# Patient Record
Sex: FEMALE | Race: WHITE | NOT HISPANIC OR LATINO | Employment: OTHER | URBAN - METROPOLITAN AREA
[De-identification: names, ages, dates, MRNs, and addresses within clinical notes are randomized per-mention and may not be internally consistent; named-entity substitution may affect disease eponyms.]

---

## 2018-09-02 ENCOUNTER — HOSPITAL ENCOUNTER (EMERGENCY)
Facility: MEDICAL CENTER | Age: 83
End: 2018-09-02
Attending: EMERGENCY MEDICINE
Payer: MEDICARE

## 2018-09-02 ENCOUNTER — APPOINTMENT (OUTPATIENT)
Dept: RADIOLOGY | Facility: MEDICAL CENTER | Age: 83
End: 2018-09-02
Attending: EMERGENCY MEDICINE
Payer: MEDICARE

## 2018-09-02 VITALS
SYSTOLIC BLOOD PRESSURE: 160 MMHG | TEMPERATURE: 98.5 F | BODY MASS INDEX: 29.09 KG/M2 | OXYGEN SATURATION: 98 % | RESPIRATION RATE: 18 BRPM | DIASTOLIC BLOOD PRESSURE: 80 MMHG | HEART RATE: 67 BPM | WEIGHT: 158.07 LBS | HEIGHT: 62 IN

## 2018-09-02 DIAGNOSIS — T88.7XXA MEDICATION SIDE EFFECT: ICD-10-CM

## 2018-09-02 DIAGNOSIS — N39.0 URINARY TRACT INFECTION WITHOUT HEMATURIA, SITE UNSPECIFIED: ICD-10-CM

## 2018-09-02 LAB
ALBUMIN SERPL BCP-MCNC: 3.9 G/DL (ref 3.2–4.9)
ALBUMIN/GLOB SERPL: 1.1 G/DL
ALP SERPL-CCNC: 95 U/L (ref 30–99)
ALT SERPL-CCNC: 17 U/L (ref 2–50)
ANION GAP SERPL CALC-SCNC: 10 MMOL/L (ref 0–11.9)
APPEARANCE UR: ABNORMAL
AST SERPL-CCNC: 23 U/L (ref 12–45)
BACTERIA #/AREA URNS HPF: ABNORMAL /HPF
BASOPHILS # BLD AUTO: 0.1 % (ref 0–1.8)
BASOPHILS # BLD: 0.01 K/UL (ref 0–0.12)
BILIRUB SERPL-MCNC: 0.5 MG/DL (ref 0.1–1.5)
BILIRUB UR QL STRIP.AUTO: NEGATIVE
BUN SERPL-MCNC: 14 MG/DL (ref 8–22)
CALCIUM SERPL-MCNC: 9.2 MG/DL (ref 8.4–10.2)
CHLORIDE SERPL-SCNC: 96 MMOL/L (ref 96–112)
CO2 SERPL-SCNC: 24 MMOL/L (ref 20–33)
COLOR UR: YELLOW
CREAT SERPL-MCNC: 0.71 MG/DL (ref 0.5–1.4)
EOSINOPHIL # BLD AUTO: 0.02 K/UL (ref 0–0.51)
EOSINOPHIL NFR BLD: 0.3 % (ref 0–6.9)
EPI CELLS #/AREA URNS HPF: ABNORMAL /HPF
ERYTHROCYTE [DISTWIDTH] IN BLOOD BY AUTOMATED COUNT: 40.2 FL (ref 35.9–50)
GLOBULIN SER CALC-MCNC: 3.5 G/DL (ref 1.9–3.5)
GLUCOSE SERPL-MCNC: 208 MG/DL (ref 65–99)
GLUCOSE UR STRIP.AUTO-MCNC: NEGATIVE MG/DL
HCT VFR BLD AUTO: 35.1 % (ref 37–47)
HGB BLD-MCNC: 11.3 G/DL (ref 12–16)
IMM GRANULOCYTES # BLD AUTO: 0.02 K/UL (ref 0–0.11)
IMM GRANULOCYTES NFR BLD AUTO: 0.3 % (ref 0–0.9)
KETONES UR STRIP.AUTO-MCNC: NEGATIVE MG/DL
LACTATE BLD-SCNC: 2.9 MMOL/L (ref 0.5–2)
LEUKOCYTE ESTERASE UR QL STRIP.AUTO: ABNORMAL
LYMPHOCYTES # BLD AUTO: 1.09 K/UL (ref 1–4.8)
LYMPHOCYTES NFR BLD: 15.4 % (ref 22–41)
MCH RBC QN AUTO: 25.1 PG (ref 27–33)
MCHC RBC AUTO-ENTMCNC: 32.2 G/DL (ref 33.6–35)
MCV RBC AUTO: 77.8 FL (ref 81.4–97.8)
MICRO URNS: ABNORMAL
MONOCYTES # BLD AUTO: 0.26 K/UL (ref 0–0.85)
MONOCYTES NFR BLD AUTO: 3.7 % (ref 0–13.4)
NEUTROPHILS # BLD AUTO: 5.7 K/UL (ref 2–7.15)
NEUTROPHILS NFR BLD: 80.2 % (ref 44–72)
NITRITE UR QL STRIP.AUTO: NEGATIVE
NRBC # BLD AUTO: 0 K/UL
NRBC BLD-RTO: 0 /100 WBC
PH UR STRIP.AUTO: 7 [PH]
PLATELET # BLD AUTO: 241 K/UL (ref 164–446)
PMV BLD AUTO: 9.8 FL (ref 9–12.9)
POTASSIUM SERPL-SCNC: 3.4 MMOL/L (ref 3.6–5.5)
PROT SERPL-MCNC: 7.4 G/DL (ref 6–8.2)
PROT UR QL STRIP: NEGATIVE MG/DL
RBC # BLD AUTO: 4.51 M/UL (ref 4.2–5.4)
RBC # URNS HPF: ABNORMAL /HPF
RBC UR QL AUTO: ABNORMAL
SODIUM SERPL-SCNC: 130 MMOL/L (ref 135–145)
SP GR UR STRIP.AUTO: <=1.005
WBC # BLD AUTO: 7.1 K/UL (ref 4.8–10.8)
WBC #/AREA URNS HPF: ABNORMAL /HPF

## 2018-09-02 PROCEDURE — 83605 ASSAY OF LACTIC ACID: CPT

## 2018-09-02 PROCEDURE — 81001 URINALYSIS AUTO W/SCOPE: CPT

## 2018-09-02 PROCEDURE — 85025 COMPLETE CBC W/AUTO DIFF WBC: CPT

## 2018-09-02 PROCEDURE — 99284 EMERGENCY DEPT VISIT MOD MDM: CPT

## 2018-09-02 PROCEDURE — 700102 HCHG RX REV CODE 250 W/ 637 OVERRIDE(OP): Performed by: EMERGENCY MEDICINE

## 2018-09-02 PROCEDURE — 80053 COMPREHEN METABOLIC PANEL: CPT

## 2018-09-02 PROCEDURE — A9270 NON-COVERED ITEM OR SERVICE: HCPCS | Performed by: EMERGENCY MEDICINE

## 2018-09-02 PROCEDURE — 71045 X-RAY EXAM CHEST 1 VIEW: CPT

## 2018-09-02 RX ORDER — ACETAMINOPHEN 325 MG/1
650 TABLET ORAL ONCE
Status: COMPLETED | OUTPATIENT
Start: 2018-09-02 | End: 2018-09-02

## 2018-09-02 RX ORDER — CARVEDILOL 25 MG/1
25 TABLET ORAL 2 TIMES DAILY WITH MEALS
COMMUNITY

## 2018-09-02 RX ORDER — GABAPENTIN 100 MG/1
100 CAPSULE ORAL 2 TIMES DAILY
COMMUNITY

## 2018-09-02 RX ORDER — PRAVASTATIN SODIUM 10 MG
10 TABLET ORAL NIGHTLY
COMMUNITY

## 2018-09-02 RX ORDER — NITROFURANTOIN 25; 75 MG/1; MG/1
100 CAPSULE ORAL 2 TIMES DAILY
Qty: 10 CAP | Refills: 0 | Status: SHIPPED | OUTPATIENT
Start: 2018-09-02 | End: 2018-09-02

## 2018-09-02 RX ORDER — ONDANSETRON 4 MG/1
4 TABLET, ORALLY DISINTEGRATING ORAL EVERY 6 HOURS PRN
COMMUNITY

## 2018-09-02 RX ORDER — HYDROCHLOROTHIAZIDE 25 MG/1
25 TABLET ORAL DAILY
COMMUNITY

## 2018-09-02 RX ORDER — CEPHALEXIN 250 MG/1
500 CAPSULE ORAL ONCE
Status: COMPLETED | OUTPATIENT
Start: 2018-09-02 | End: 2018-09-02

## 2018-09-02 RX ORDER — LISINOPRIL 20 MG/1
40 TABLET ORAL DAILY
COMMUNITY

## 2018-09-02 RX ORDER — NITROFURANTOIN 25; 75 MG/1; MG/1
100 CAPSULE ORAL ONCE
Status: DISCONTINUED | OUTPATIENT
Start: 2018-09-02 | End: 2018-09-02

## 2018-09-02 RX ORDER — BUPIVACAINE HYDROCHLORIDE 2.5 MG/ML
1.53 INJECTION, SOLUTION EPIDURAL; INFILTRATION; INTRACAUDAL CONTINUOUS
COMMUNITY

## 2018-09-02 RX ORDER — CEPHALEXIN 500 MG/1
500 CAPSULE ORAL 2 TIMES DAILY
Qty: 10 CAP | Refills: 0 | Status: SHIPPED | OUTPATIENT
Start: 2018-09-02 | End: 2018-09-07

## 2018-09-02 RX ORDER — AMLODIPINE BESYLATE 5 MG/1
5 TABLET ORAL DAILY
COMMUNITY

## 2018-09-02 RX ADMIN — ACETAMINOPHEN 650 MG: 325 TABLET, FILM COATED ORAL at 14:57

## 2018-09-02 RX ADMIN — CEPHALEXIN 500 MG: 250 CAPSULE ORAL at 15:59

## 2018-09-02 ASSESSMENT — PAIN SCALES - GENERAL: PAINLEVEL_OUTOF10: 0

## 2018-09-02 NOTE — ED PROVIDER NOTES
"ED Provider Note    ED Provider Note    Primary care provider: Zhane Bazzi M.D.  Means of arrival: walk in  History obtained from: Patient    CHIEF COMPLAINT  Chief Complaint   Patient presents with   • Other     Seen at 1:01 PM.   HPI  Krystin Rivera is a 83 y.o. female who presents to the Emergency Department with a chief complaint of feeling \"groggy \".  She has chronic back pain and is on a intrathecal pain pump.  She has been on this for 2 years and had no significant changes in her dosing.  This week she had the addition of Prialt by her pain management doctor as the patient was complaining of worsening back pain.  She does have chronic back pain despite the use of the pump.  She is nonambulatory at baseline though she does transfer.  This is been an ongoing issue and the patient has not ever been pain-free.    She notes for the past 2 days, she is hypersomnolent and groggy.  She notes the pain is worse though she did not take her bolus doses of pain medication secondary to her daughter's suggestion of avoiding additional medications.    She denies any headaches, fevers, chills, visual changes, abdominal pain, nausea, vomiting, dysuria, new numbness or new focal weakness.  She does feel generalized weakness, she has diffuse neuropathy at baseline, she has urge incontinence at baseline, she has photophobia at baseline.  She notes some right-sided lower chest wall pain and a cough for the past few days as well and she feels that her breathing is not quite as good as usual.    REVIEW OF SYSTEMS  See HPI,   Remainder of ROS negative.     PAST MEDICAL HISTORY   has a past medical history of Diabetes (HCC) and Hypertension.    SURGICAL HISTORY  patient denies any surgical history    SOCIAL HISTORY  Social History   Substance Use Topics   • Smoking status: Never Smoker   • Smokeless tobacco: Never Used   • Alcohol use No      History   Drug Use No       FAMILY HISTORY  History reviewed. No pertinent family " "history.    CURRENT MEDICATIONS  Reviewed.  See Encounter Summary.     ALLERGIES  Allergies   Allergen Reactions   • Other Drug      Steroids.  hallucinations       PHYSICAL EXAM  VITAL SIGNS: /80   Pulse 67   Temp 36.9 °C (98.5 °F)   Resp 18   Ht 1.575 m (5' 2\")   Wt 71.7 kg (158 lb 1.1 oz)   SpO2 98%   BMI 28.91 kg/m²   Constitutional: Awake, alert in no apparent distress.  Stays awake throughout the duration of the history and physical examination.  She does appear to be mildly sleepy.  HENT: Normocephalic, atraumatic, bilateral external ears normal. Nose normal.   Eyes: Conjunctiva normal, non-icteric, EOMI. PERRLA  Thorax & Lungs: Easy unlabored respirations, Clear to ascultation bilaterally.  Cardiovascular: Regular rate, Regular rhythm, No murmurs, rubs or gallops.  Abdomen:  Soft, nontender, nondistended, normal active bowel sounds.   :    Skin: Visualized skin is  Dry, No erythema, No rash.  Diffuse nevi across the back and abdomen.  Musculoskeletal:   No cyanosis, clubbing or edema.  The back is normal in appearance.  There is no midline tenderness or step-off.  The pain pump is implanted in the right upper gluteal region, overlying skin is normal.  Neurologic: Alert, Grossly non-focal.  Patient has excellent strength of the upper extremities, she is able to give 5/5 strength in plantar and dorsiflexion at the bilateral lower extremities.  Sensation is intact to light touch.  Deep tendon reflexes 1+ at bilateral patella.  Psychiatric: Normal affect, Normal mood  Lymphatic:  No cervical LAD  RADIOLOGY  DX-CHEST-PORTABLE (1 VIEW)   Final Result      Left basal atelectasis or scarring.            COURSE & MEDICAL DECISION MAKING  Pertinent Labs & Imaging studies reviewed. (See chart for details)    Differential diagnoses include but are not limited to: Medication side effect, sepsis    1:01 PM - Medical record reviewed, patient seen and examined at bedside.    1:05 PM - Aram (Nurse from " [employer]:   Fentanyl 62mcg/day, Dilaudid 250mcg, bupivicaine, Prialt ([facility])- new past week. Can cause AVH.   Dr. Behrman. [phone]    2:45 PM -I discussed the case with Aram from [employer] as well as Dr. Behrman.  We will decrease the intrathecal basal rate by 25%.  The patient will still be able to use her bolus as needed.  I reviewed the labs with the patient, it appears that she has a urinary tract infection, the rest of her blood work is unremarkable.  She does not have acute renal failure, severe electrolyte derangement, leukocytosis or leftward shift.  She does have a mildly increased lactic acid, I do not feel this represents sepsis and is more a sequelae of mild dehydration or possibly metformin use.        Decision Making:  This is a 83 y.o. year old female who presents with increased somnolence after a change in her intrathecal pain pump.  We reviewed the medications with the patient.  She has had challenging pain to control at baseline.  They increased her calcium channel blocker which is known to cause sedation and hallucinations.  We believe that this is the cause of the patient's somnolence.  In the emergency department she is awake and able to answer all questions, she did not appear to be suffering from acute bacterial meningitis or encephalitis.  She did not have a headache, she does not have any confusion.  She is neurologically intact.  Her back pain appears to be at baseline.  Laboratory evaluation is unremarkable with exception of urinary tract infection and mildly elevated lactic acid.  This will be treated supportively as well as I will start her on Keflex for her UTI.  I recommend she follow-up with pain management the next 48 hours.  In all likelihood she will have an increase in her back pain is we are decreasing the Dilaudid and fentanyl in addition to decreasing the Prialt.  She will have to have a new mixture made at the pain management center.      I discussed this with the family.   I recommend she return for any intractable pain, confusion, fevers or any other concern.    Discharge Medications:  Discharge Medication List as of 9/2/2018  4:02 PM      START taking these medications    Details   cephALEXin (KEFLEX) 500 MG Cap Take 1 Cap by mouth 2 times a day for 5 days., Disp-10 Cap, R-0, Print Rx Paper             The patient was discharged home (see d/c instructions) and parent was told to return immediately for any signs or symptoms listed, or any worsening at all.  The patient's parent verbally agreed to the discharge precautions and follow-up plan which is documented in EPIC.    The patient is discharged home in good condition.    FINAL IMPRESSION  1. Medication side effect    2. Urinary tract infection without hematuria, site unspecified

## 2018-09-02 NOTE — ED NOTES
Patient arriving via W/C from triage.  Patient thinks that her intrathecal pain pump's basal delivery rate is too high because she has been feeling somnolent.  Pt is currently A&Ox4.

## 2018-09-02 NOTE — ED NOTES
Pt presents accompanied by her family.  She reports a hx of intrathecal pain pump placement for the past 2 years, because of chronic back pain.  She C/O excessive somnolence, and was directed by the device rep to follow up with our facility.  He will be adjusting the setting as necessary.

## 2018-09-02 NOTE — ED NOTES
[employer] representative here to see patient and to adjust her basal rate on her intrathecal pump.  Pt. In NAD.  Patient did request Tylenol earlier in an effort to avoid using the bolus function of her pump until assessed by the device representative.

## 2021-01-14 DIAGNOSIS — Z23 NEED FOR VACCINATION: ICD-10-CM

## 2024-04-08 NOTE — ED NOTES
"[facility]: Outpatient Surgery  Preprocedure Instructions    Expectations: "Because of inconsistent procedure completion times, an unexpected wait may occur. The physicians would like you to be here to prepare in the event they run ahead of time. We will make you as comfortable as possible and keep you informed. We apologize in advance if this happens."     Your arrival time for your surgery or procedure is _0500_____.  We ask patients to arrive about 2 hours before surgery to allow for enough time to review your health history & medications, start your IV, complete any outstanding labwork or tests, and meet your Anesthesiologist.    We are located at [facility], [address] [place] [address].    Enter through the [place] entrance next to the Emergency Room, and come to the 6th floor to the Outpatient Surgery Department.    If you are in need of a wheelchair the morning of surgery please call [phone] about 15 minutes before you arrive. Parking is available in our parking garage located off [facility], between the hospital and [facility].      Visitory Policy:  You are allowed 2 adult visitors to be with you in the hospital. Please, no switching visitors in pre-op area. All hospital visitors should be in good current health.  No small children.  We will update you and your family hourly on the progression of surgery and any unexpected delays.      What to Bring:  Please have your ID, insurance cards, and all home medication bottles with you at check in.  Bring your CPAP machine if one is used at home.     Fasting:  Nothing to eat or drink after midnight the night before your procedure. This includes no ice, gum, hard candies, and/or tobacco products.    Medications:  Follow your doctor's instructions for taking any medications on the morning of your procedure.  If no instructions for taking medications were given, do not take any medications but bring your " X ray in room.     medications in their bottles to your procedure check in.     Follow your doctor's preoperative instructions regarding skin prep, bowel prep, bathing, or showering prior to your procedure.  If any special soaps were provided to you, please use according to your doctor's instructions. If no instructions were given from your doctor, take a good bath or shower with antibacterial soap the night before and the morning of your procedure.  On the morning of procedure, wear loose, comfortable clothing.  No lotions, makeup, perfumes, colognes, deodorant, or jewelry to your procedure.  Removable items (glasses, contact lenses, dentures, retainers, hearing aids) need to be removed for your procedure.  Bring your storage containers for these items if you wear them.     Artificial nails, body jewelry, eyelash extensions, and/or hair extensions with metal clips are not allowed during your surgery.  If you currently wear any of these items, please arrange for them to be removed prior to your arrival to the hospital.     Outpatient or Same Day Surgeries:  Any patients receiving sedation/anesthesia are advised not to drive for 24 hours after their procedure.  We do not allow patients to drive themselves home after discharge.  If you are going home after your procedure, please have someone available to drive you home from the hospital.     You may call the Outpatient Surgery Department at [phone] with any questions or concerns.  We are looking forward to meeting you and taking great care of you for your procedure.  Thank you for choosing [facility] for your surgical needs.